# Patient Record
Sex: MALE | Race: WHITE | NOT HISPANIC OR LATINO | URBAN - METROPOLITAN AREA
[De-identification: names, ages, dates, MRNs, and addresses within clinical notes are randomized per-mention and may not be internally consistent; named-entity substitution may affect disease eponyms.]

---

## 2020-02-13 ENCOUNTER — NEW PATIENT (OUTPATIENT)
Dept: URBAN - METROPOLITAN AREA CLINIC 51 | Facility: CLINIC | Age: 80
End: 2020-02-13
Payer: MEDICARE

## 2020-02-13 DIAGNOSIS — H43.813 VITREOUS DEGENERATION, BILATERAL: ICD-10-CM

## 2020-02-13 DIAGNOSIS — H18.423 BAND KERATOPATHY, BILATERAL: ICD-10-CM

## 2020-02-13 DIAGNOSIS — H25.813 COMBINED FORMS OF AGE-RELATED CATARACT, BILATERAL: ICD-10-CM

## 2020-02-13 DIAGNOSIS — H17.12 CENTRAL CORNEAL OPACITY OF LEFT EYE: ICD-10-CM

## 2020-02-13 DIAGNOSIS — H04.123 TEAR FILM INSUFFICIENCY OF BILATERAL LACRIMAL GLANDS: ICD-10-CM

## 2020-02-13 DIAGNOSIS — H52.4 PRESBYOPIA: ICD-10-CM

## 2020-02-13 PROCEDURE — 92133 CPTRZD OPH DX IMG PST SGM ON: CPT | Performed by: OPTOMETRIST

## 2020-02-13 PROCEDURE — 92083 EXTENDED VISUAL FIELD XM: CPT | Performed by: OPTOMETRIST

## 2020-02-13 PROCEDURE — 92004 COMPRE OPH EXAM NEW PT 1/>: CPT | Performed by: OPTOMETRIST

## 2020-02-13 ASSESSMENT — KERATOMETRY
OS: 42.67
OD: 42.78

## 2020-02-13 ASSESSMENT — VISUAL ACUITY
OS: 20/20
OD: 20/20

## 2020-02-13 ASSESSMENT — INTRAOCULAR PRESSURE
OD: 20
OS: 20

## 2020-08-20 ENCOUNTER — FOLLOW UP ESTABLISHED (OUTPATIENT)
Dept: URBAN - METROPOLITAN AREA CLINIC 51 | Facility: CLINIC | Age: 80
End: 2020-08-20
Payer: MEDICARE

## 2020-08-20 DIAGNOSIS — H40.013 OPEN ANGLE WITH BORDERLINE FINDINGS, LOW RISK, BILATERAL: Primary | ICD-10-CM

## 2020-08-20 PROCEDURE — 92083 EXTENDED VISUAL FIELD XM: CPT | Performed by: OPTOMETRIST

## 2020-08-20 PROCEDURE — 92012 INTRM OPH EXAM EST PATIENT: CPT | Performed by: OPTOMETRIST

## 2020-08-20 ASSESSMENT — INTRAOCULAR PRESSURE
OD: 20
OS: 20

## 2022-05-17 ENCOUNTER — OFFICE VISIT (OUTPATIENT)
Dept: URBAN - METROPOLITAN AREA CLINIC 51 | Facility: CLINIC | Age: 82
End: 2022-05-17
Payer: MEDICARE

## 2022-05-17 DIAGNOSIS — H40.013 OPEN ANGLE WITH BORDERLINE FINDINGS, LOW RISK, BILATERAL: ICD-10-CM

## 2022-05-17 DIAGNOSIS — H52.4 PRESBYOPIA: ICD-10-CM

## 2022-05-17 DIAGNOSIS — H04.123 TEAR FILM INSUFFICIENCY OF BILATERAL LACRIMAL GLANDS: ICD-10-CM

## 2022-05-17 DIAGNOSIS — H25.813 COMBINED FORMS OF AGE-RELATED CATARACT, BILATERAL: Primary | ICD-10-CM

## 2022-05-17 DIAGNOSIS — H18.423 BAND KERATOPATHY, BILATERAL: ICD-10-CM

## 2022-05-17 DIAGNOSIS — H43.813 VITREOUS DEGENERATION, BILATERAL: ICD-10-CM

## 2022-05-17 PROCEDURE — 92134 CPTRZ OPH DX IMG PST SGM RTA: CPT | Performed by: OPTOMETRIST

## 2022-05-17 PROCEDURE — 92083 EXTENDED VISUAL FIELD XM: CPT | Performed by: OPTOMETRIST

## 2022-05-17 PROCEDURE — 99214 OFFICE O/P EST MOD 30 MIN: CPT | Performed by: OPTOMETRIST

## 2022-05-17 PROCEDURE — 92133 CPTRZD OPH DX IMG PST SGM ON: CPT | Performed by: OPTOMETRIST

## 2022-05-17 ASSESSMENT — VISUAL ACUITY
OS: 20/25
OD: 20/25

## 2022-05-17 ASSESSMENT — INTRAOCULAR PRESSURE
OD: 21
OS: 19

## 2022-05-17 NOTE — IMPRESSION/PLAN
Impression: Tear film insufficiency of bilateral lacrimal glands Plan: Recommend patient to use ATs QID or more OU from a list of preferred brands

## 2022-05-17 NOTE — IMPRESSION/PLAN
Impression: Open angle with borderline findings, low risk, bilateral
*IOPs today 21mmHg OD and 19mmHg OS without medication. *OCT RNFL (5/17/22): OD: 93um ; no thinning OS: 69um ; borderline nasal thinning; abnormal sup thinning *OCT RNFL (02/13/2020) OD: 72um ; borderline superior/nasal thinning OS: 77um ; no thinning *HVF 24-2 (5/17/22): OD: full OS:  inferior nasal step corresponding to RNFL *HVF 24-2 (08/20/2020) OD: full OS: enlarged blind spot; scattered defects *HVF 24-2 (02/13/2020) OD: non specific defect OS: enlarged blind spot ; full Plan: Reviewed the results of my examination today with the patient. Reviewed updated HVF and RNFL. Will need a repeat of HVF to assess statistical reliability of VF defects. Will review test results at follow up and course of treatment plan.

## 2022-06-30 ENCOUNTER — OFFICE VISIT (OUTPATIENT)
Dept: URBAN - METROPOLITAN AREA CLINIC 51 | Facility: CLINIC | Age: 82
End: 2022-06-30
Payer: MEDICARE

## 2022-06-30 DIAGNOSIS — H40.013 OPEN ANGLE WITH BORDERLINE FINDINGS, LOW RISK, BILATERAL: Primary | ICD-10-CM

## 2022-06-30 PROCEDURE — 99213 OFFICE O/P EST LOW 20 MIN: CPT | Performed by: OPTOMETRIST

## 2022-06-30 PROCEDURE — 92083 EXTENDED VISUAL FIELD XM: CPT | Performed by: OPTOMETRIST

## 2022-06-30 ASSESSMENT — INTRAOCULAR PRESSURE
OS: 16
OD: 16

## 2022-06-30 NOTE — IMPRESSION/PLAN
Impression: Open angle with borderline findings, low risk, bilateral
*IOPs today 16mmHg OU without medication. *OCT RNFL (5/17/22): OD: 93um ; no thinning OS: 69um ; borderline nasal thinning; abnormal sup thinning *OCT RNFL (02/13/2020) OD: 72um ; borderline superior/nasal thinning OS: 77um ; no thinning *HVF 24-2 (6/29/22): OD: full OS: superior nasal step, mild; not repeatable *HVF 24-2 (5/17/22): OD: full OS:  inferior nasal step corresponding to RNFL *HVF 24-2 (08/20/2020) OD: full OS: enlarged blind spot; scattered defects *HVF 24-2 (02/13/2020) OD: non specific defect OS: enlarged blind spot ; full Plan: I counseled the patient regarding the following: Glaucoma suspects do not usually need to be treated but do need close follow up monitoring for early signs of glaucoma damage. Some glaucoma suspects require treatment, and the same medical options for glaucoma eye drops and pills are utilized. Laser procedures and intraocular surgery are usually reserved for patients with uncontrolled glaucoma. Expectations: Glaucoma is usually diagnosed when the following three criteria are present: elevated intraocular pressure, optic nerve damage, and visual field loss. When some of these criteria are missing, it can be challenging to make the diagnosis of glaucoma. Glaucoma suspects may also include individuals with other risk factors such as a family history of glaucoma, temporary elevations of IO when taking steroid medications, pseudoexfoliation syndrome, pigmentary dispersion syndrome, thin central corneal thickness, and optic disc hemorrhages. [place] IOP, OCT RNFL and [place]F annually at [facility]

## 2023-06-09 ENCOUNTER — OFFICE VISIT (OUTPATIENT)
Dept: URBAN - METROPOLITAN AREA CLINIC 51 | Facility: CLINIC | Age: 83
End: 2023-06-09
Payer: MEDICARE

## 2023-06-09 DIAGNOSIS — H17.12 CENTRAL CORNEAL OPACITY OF LEFT EYE: ICD-10-CM

## 2023-06-09 DIAGNOSIS — H25.813 COMBINED FORMS OF AGE-RELATED CATARACT, BILATERAL: Primary | ICD-10-CM

## 2023-06-09 DIAGNOSIS — H43.813 VITREOUS DEGENERATION, BILATERAL: ICD-10-CM

## 2023-06-09 DIAGNOSIS — H52.4 PRESBYOPIA: ICD-10-CM

## 2023-06-09 DIAGNOSIS — H04.123 TEAR FILM INSUFFICIENCY OF BILATERAL LACRIMAL GLANDS: ICD-10-CM

## 2023-06-09 DIAGNOSIS — H35.443 AGE-RELATED RETICULAR DEGENERATION OF RETINA, BILATERAL: ICD-10-CM

## 2023-06-09 DIAGNOSIS — H40.013 OPEN ANGLE WITH BORDERLINE FINDINGS, LOW RISK, BILATERAL: ICD-10-CM

## 2023-06-09 DIAGNOSIS — H18.423 BAND KERATOPATHY, BILATERAL: ICD-10-CM

## 2023-06-09 PROCEDURE — 92134 CPTRZ OPH DX IMG PST SGM RTA: CPT | Performed by: OPTOMETRIST

## 2023-06-09 PROCEDURE — 92083 EXTENDED VISUAL FIELD XM: CPT | Performed by: OPTOMETRIST

## 2023-06-09 PROCEDURE — 92133 CPTRZD OPH DX IMG PST SGM ON: CPT | Performed by: OPTOMETRIST

## 2023-06-09 PROCEDURE — 92014 COMPRE OPH EXAM EST PT 1/>: CPT | Performed by: OPTOMETRIST

## 2023-06-09 ASSESSMENT — VISUAL ACUITY
OD: 20/30
OS: 20/25

## 2023-06-09 ASSESSMENT — INTRAOCULAR PRESSURE
OS: 16
OD: 18

## 2023-06-09 NOTE — IMPRESSION/PLAN
Impression: Open angle with borderline findings, low risk, bilateral
*IOPs today 18mmHg OD and 16mmHg OS without medication. *OCT RNFL (06/09/2023) OD: 98um ; no thinning OS: 76um ; no thinning *OCT RNFL (5/17/22) OD: 93um ; no thinning OS: 69um ; borderline nasal thinning; abnormal sup thinning *OCT RNFL (02/13/2020) OD: 72um ; borderline superior/nasal thinning OS: 77um ; no thinning *HVF 24-2 (06/09/2023.) OD: inferior nasal step OS: bi nasal step; inf arcuate *HVF 24-2 (6/29/22) OD: full OS: superior nasal step, mild; not repeatable *HVF 24-2 (5/17/22): OD: full OS:  inferior nasal step corresponding to RNFL *HVF 24-2 (08/20/2020) OD: full OS: enlarged blind spot; scattered defects *HVF 24-2 (02/13/2020) OD: non specific defect OS: enlarged blind spot ; full Plan: I counseled the patient regarding the following: Glaucoma suspects do not usually need to be treated but do need close follow up monitoring for early signs of glaucoma damage. Some glaucoma suspects require treatment, and the same medical options for glaucoma eye drops and pills are utilized. Laser procedures and intraocular surgery are usually reserved for patients with uncontrolled glaucoma. Expectations: Glaucoma is usually diagnosed when the following three criteria are present: elevated intraocular pressure, optic nerve damage, and visual field loss. When some of these criteria are missing, it can be challenging to make the diagnosis of glaucoma. Glaucoma suspects may also include individuals with other risk factors such as a family history of glaucoma, temporary elevations of IO when taking steroid medications, pseudoexfoliation syndrome, pigmentary dispersion syndrome, thin central corneal thickness, and optic disc hemorrhages. Check IOP, OCT RNFL and HVF annually at [facility].

## 2024-06-11 ENCOUNTER — OFFICE VISIT (OUTPATIENT)
Dept: URBAN - METROPOLITAN AREA CLINIC 51 | Facility: CLINIC | Age: 84
End: 2024-06-11
Payer: MEDICARE

## 2024-06-11 DIAGNOSIS — H04.123 TEAR FILM INSUFFICIENCY OF BILATERAL LACRIMAL GLANDS: ICD-10-CM

## 2024-06-11 DIAGNOSIS — H25.813 COMBINED FORMS OF AGE-RELATED CATARACT, BILATERAL: Primary | ICD-10-CM

## 2024-06-11 DIAGNOSIS — H18.423 BAND KERATOPATHY, BILATERAL: ICD-10-CM

## 2024-06-11 DIAGNOSIS — H17.12 CENTRAL CORNEAL OPACITY OF LEFT EYE: ICD-10-CM

## 2024-06-11 DIAGNOSIS — H52.4 PRESBYOPIA: ICD-10-CM

## 2024-06-11 DIAGNOSIS — H43.813 VITREOUS DEGENERATION, BILATERAL: ICD-10-CM

## 2024-06-11 DIAGNOSIS — H35.443 AGE-RELATED RETICULAR DEGENERATION OF RETINA, BILATERAL: ICD-10-CM

## 2024-06-11 DIAGNOSIS — H40.013 OPEN ANGLE WITH BORDERLINE FINDINGS, LOW RISK, BILATERAL: ICD-10-CM

## 2024-06-11 PROCEDURE — 92014 COMPRE OPH EXAM EST PT 1/>: CPT | Performed by: OPTOMETRIST

## 2024-06-11 PROCEDURE — 92134 CPTRZ OPH DX IMG PST SGM RTA: CPT | Performed by: OPTOMETRIST

## 2024-06-11 PROCEDURE — 92133 CPTRZD OPH DX IMG PST SGM ON: CPT | Performed by: OPTOMETRIST

## 2024-06-11 PROCEDURE — 92083 EXTENDED VISUAL FIELD XM: CPT | Performed by: OPTOMETRIST

## 2024-06-11 ASSESSMENT — KERATOMETRY
OD: 42.88
OS: 42.50

## 2024-06-11 ASSESSMENT — VISUAL ACUITY
OS: 20/25
OD: 20/25

## 2024-06-11 ASSESSMENT — INTRAOCULAR PRESSURE
OD: 13
OS: 15

## 2025-06-12 ENCOUNTER — OFFICE VISIT (OUTPATIENT)
Dept: URBAN - METROPOLITAN AREA CLINIC 51 | Facility: CLINIC | Age: 85
End: 2025-06-12
Payer: MEDICARE

## 2025-06-12 DIAGNOSIS — H43.813 VITREOUS DEGENERATION, BILATERAL: ICD-10-CM

## 2025-06-12 DIAGNOSIS — H25.813 COMBINED FORMS OF AGE-RELATED CATARACT, BILATERAL: Primary | ICD-10-CM

## 2025-06-12 DIAGNOSIS — H52.4 PRESBYOPIA: ICD-10-CM

## 2025-06-12 DIAGNOSIS — H17.12 CENTRAL CORNEAL OPACITY OF LEFT EYE: ICD-10-CM

## 2025-06-12 DIAGNOSIS — H40.013 OPEN ANGLE WITH BORDERLINE FINDINGS, LOW RISK, BILATERAL: ICD-10-CM

## 2025-06-12 DIAGNOSIS — H18.423 BAND KERATOPATHY, BILATERAL: ICD-10-CM

## 2025-06-12 DIAGNOSIS — H04.123 TEAR FILM INSUFFICIENCY OF BILATERAL LACRIMAL GLANDS: ICD-10-CM

## 2025-06-12 PROCEDURE — 92133 CPTRZD OPH DX IMG PST SGM ON: CPT | Performed by: OPTOMETRIST

## 2025-06-12 PROCEDURE — 92014 COMPRE OPH EXAM EST PT 1/>: CPT | Performed by: OPTOMETRIST

## 2025-06-12 PROCEDURE — 92134 CPTRZ OPH DX IMG PST SGM RTA: CPT | Performed by: OPTOMETRIST

## 2025-06-12 PROCEDURE — 92083 EXTENDED VISUAL FIELD XM: CPT | Performed by: OPTOMETRIST

## 2025-06-12 ASSESSMENT — VISUAL ACUITY
OD: 20/25
OS: 20/25

## 2025-06-12 ASSESSMENT — INTRAOCULAR PRESSURE
OD: 17
OS: 14

## 2025-06-12 ASSESSMENT — KERATOMETRY
OD: 42.88
OS: 42.50